# Patient Record
Sex: MALE | Race: BLACK OR AFRICAN AMERICAN | NOT HISPANIC OR LATINO | URBAN - METROPOLITAN AREA
[De-identification: names, ages, dates, MRNs, and addresses within clinical notes are randomized per-mention and may not be internally consistent; named-entity substitution may affect disease eponyms.]

---

## 2024-03-30 ENCOUNTER — EMERGENCY (EMERGENCY)
Facility: HOSPITAL | Age: 24
LOS: 0 days | Discharge: HOME | End: 2024-03-30
Attending: STUDENT IN AN ORGANIZED HEALTH CARE EDUCATION/TRAINING PROGRAM
Payer: SELF-PAY

## 2024-03-30 VITALS
OXYGEN SATURATION: 100 % | DIASTOLIC BLOOD PRESSURE: 78 MMHG | SYSTOLIC BLOOD PRESSURE: 132 MMHG | RESPIRATION RATE: 16 BRPM | HEART RATE: 67 BPM | TEMPERATURE: 98 F
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
## 2024-03-30 DIAGNOSIS — T15.01XA FOREIGN BODY IN CORNEA, RIGHT EYE, INITIAL ENCOUNTER: ICD-10-CM
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
## 2024-03-30 PROCEDURE — 99284 EMERGENCY DEPT VISIT MOD MDM: CPT

## 2024-03-30 PROCEDURE — 99283 EMERGENCY DEPT VISIT LOW MDM: CPT

## 2024-03-30 RX ORDER — FLUORESCEIN SODIUM 9 MG
1 STRIP OPHTHALMIC (EYE) ONCE
Refills: 0 | Status: DISCONTINUED | OUTPATIENT
Start: 2024-03-30 | End: 2024-03-30

## 2024-03-30 RX ORDER — POLYMYXIN B SULF/TRIMETHOPRIM 10000-1/ML
1 DROPS OPHTHALMIC (EYE) ONCE
Refills: 0 | Status: DISCONTINUED | OUTPATIENT
Start: 2024-03-30 | End: 2024-03-30

## 2024-03-30 NOTE — ED PROVIDER NOTE - PHYSICAL EXAMINATION
GENERAL: Well-developed; well-nourished; in no acute distress.  SKIN: warm, well perfused  HEAD: Normocephalic; atraumatic.  EYES: mild conjunctival erythema of right eye. Foreign body on lateral cornea at 9 oclock position. ocular motions intact and appropriate  ENT: No nasal discharge; airway clear.  CARD: Regular rate and rhythm.   RESP: LCTAB; No wheezes or crackles  ABD: soft, nontender, and nondistended  Upper EXT: Normal ROM. Rad pulses 2+ symm, cap refill <2 secs, sensation intact and symm,  strength 5/5  Lower EXT: strength 5/5, ambulates well independently GENERAL: Well-developed; well-nourished; in no acute distress.  SKIN: warm, well perfused  HEAD: Normocephalic; atraumatic.  EYES: mild conjunctival erythema of right eye. Foreign body on lateral cornea at 9 oclock position. ocular motions intact and appropriate. Fluorescein stained eye and examined with blue light, no aqueous leakage or signs of globe rupture. Foreign body very small was removed. Visual acuity 20/20 b/l and equal.  ENT: No nasal discharge; airway clear.  CARD: Regular rate and rhythm.   RESP: LCTAB; No wheezes or crackles  ABD: soft, nontender, and nondistended  Upper EXT: Normal ROM. Rad pulses 2+ symm, cap refill <2 secs, sensation intact and symm,  strength 5/5  Lower EXT: strength 5/5, ambulates well independently

## 2024-03-30 NOTE — ED PROVIDER NOTE - CLINICAL SUMMARY MEDICAL DECISION MAKING FREE TEXT BOX
.    23-year-old male, no PMH, p/w foreign body sensation x 2 days, status post working with metal and some small particle flew into his eye.  + Eye pain.  + Increased tearing.  No vision changes, discharge, fever.    Exam as noted above, with fluorescein stain negative Isabel, small foreign body with developing rust ring seen at about the 9 o'clock position.  Small foreign body/metal particle removed with Q-tip, but developing rust ring remained.      Case discussed with ophthalmology, recommend antibiotic ophthalmic drops, and ophthalmology follow-up on Monday (2 days)    Impression ocular foreign body.  Considered admission or OBS, but given HPI, PE, w/up and ED course, admission or OBS is not indicated.    Discussed all available results with Pt.  Pt understands results, plan of care, abx drops use, outpt follow up with [facility] as discussed, and signs and symptoms for ED return.  Pt is comfortable with discharge. DC home.     .

## 2024-03-30 NOTE — ED PROVIDER NOTE - OBJECTIVE STATEMENT
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  Patient states that her 2 days ago he was shouldering metal when something flew into his head.  Has been having right-sided eye pain since then.  Endorses some teary eye.  No changes in vision.  No discharge.  No fevers, chest pain, abdominal pain.  No injuries anywhere else.

## 2024-03-30 NOTE — ED PROVIDER NOTE - NSFOLLOWUPINSTRUCTIONS_ED_ALL_ED_FT
Please follow up with your primary care physician. Return to the emergency department if your symptoms do not resolve and/or worsen. If you've been prescribed medications, please take your medications as prescribed.  ------------------------------------------------------------------------------------------------------------------------  Eye Foreign Body    WHAT YOU NEED TO KNOW:    What is an eye foreign body (EFB)? An EFB is an object that gets stuck in your eye. The object may be on the surface or in a deeper part of your eye. A FB in a deeper part of your eye may cause permanent damage or blindness. This must be treated immediately. Pieces of metal, dust, wood, and sand are the most common foreign bodies.  Eye Anatomy    What are the signs and symptoms of an EFB?    Feeling like something is in your eye    Eye pain, redness, swelling, or watering    Sensitivity to light    Blurry vision or changes in your vision  How is an EFB diagnosed? Your healthcare provider will ask about your symptoms and examine your eye. Tell your provider what you were doing when symptoms began and if you know what is in your eye. The provider may check your vision by asking you to read letters or numbers off of a chart. You may need any of the following:    A slit-lamp test uses a microscope to look into your eye and check for injury. A dye may be used to look for scratches or other damage to your eye.        Ultrasound or CT pictures may show where the FB is located in your eye. It may also show damage to deeper parts of your eye. You may be given contrast liquid to help your eye show up better in pictures. Tell the healthcare provider if you have ever had an allergic reaction to contrast liquid.  How is an EFB treated? Medicines will be given to decrease pain or prevent an infection. Your healthcare provider may numb your eye and flush it with liquid to help remove the FB. The provider may also use a cotton swab or other tools to help remove the FB. If the FB is hard to remove or has damaged deeper parts of your eye, you will need surgery to remove it.    What can I do to help my eye heal? You may have pain, sensitivity to light, or blurry vision for a few days. Do the following to help your eye heal:    Do not rub your eye. This may cause more damage or infection.    Do not wear your contacts lenses until your eye heals. Ask your healthcare provider how long to follow this direction.    Wear sunglasses as directed. Sunglasses help protect the eye and decrease sensitivity to light.  What can I do to prevent another EFB?    Wear safety glasses, eye shields, or goggles. These items can prevent eye injury. Make sure the eyewear wraps around the sides of your face. Wear these items while you work with chemicals, metal, wood, or bodily fluids such as blood. Also wear protective eyewear during sports such as racquetball or swimming. Do not use regular eye glasses for eye protection. They will not protect your eyes from foreign bodies or chemicals.  Safety Glasses      Use contact lenses as directed. Wash your hands before you clean, insert, or remove your contacts. Insert and remove contact lenses correctly. Clean and change your contacts as directed to help prevent eye damage or infection.  Handwashing  When should I seek immediate care?    You suddenly lose your vision.    You have severe eye pain.  When should I call my doctor or ophthalmologist?    You have new or worse eye swelling.    Your symptoms do not get better, even after the foreign body is removed.    You have white or yellow fluid draining from your eye.    You have questions or concerns about your condition or care.

## 2024-03-30 NOTE — ED PROVIDER NOTE - PROGRESS NOTE DETAILS
BT: Spoke with ophthalmology, patient should follow-up with clinic.  Moxifloxacin drops prescribed to patient.

## 2024-03-30 NOTE — ED ADULT NURSE NOTE - OBJECTIVE STATEMENT
Pt presents to the ED c/o eye redness and a foreign body in the eye. Pt reports that he was cutting metal 3 days ago and feels that something metal got into his eye.

## 2024-03-30 NOTE — ED ADULT TRIAGE NOTE - CHIEF COMPLAINT QUOTE
Patient presents to the ED c/o redness and pain to right eye s/p possible FB. As per patient he was cutting metal and thinks something may be in his eye 3 days ago.

## 2024-03-30 NOTE — ED PROVIDER NOTE - PATIENT PORTAL LINK FT
You can access the FollowMyHealth Patient Portal offered by [facility] by registering at the following website: [email or web address]. By joining "[employer]’s FollowMyHealth portal, you will also be able to view your health information using other applications (apps) compatible with our system.

## 2024-03-30 NOTE — ED PROVIDER NOTE - NSFOLLOWUPCLINICS_GEN_ALL_ED_FT
[facility]  Ophthalmolgy  [address], [address]  [place] [address]  Phone: [phone]  Fax:   Scheduled Appointment: 4/1/2024

## 2024-04-02 ENCOUNTER — OUTPATIENT (OUTPATIENT)
Dept: OUTPATIENT SERVICES | Facility: HOSPITAL | Age: 24
LOS: 1 days | End: 2024-04-02
Payer: MEDICAID

## 2024-04-02 ENCOUNTER — APPOINTMENT (OUTPATIENT)
Dept: OPHTHALMOLOGY | Facility: CLINIC | Age: 24
End: 2024-04-02
Payer: MEDICAID

## 2024-04-02 DIAGNOSIS — H53.8 OTHER VISUAL DISTURBANCES: ICD-10-CM

## 2024-04-02 PROBLEM — Z00.00 ENCOUNTER FOR PREVENTIVE HEALTH EXAMINATION: Status: ACTIVE | Noted: 2024-04-02

## 2024-04-02 PROCEDURE — 92002 INTRM OPH EXAM NEW PATIENT: CPT | Mod: 25

## 2024-04-02 PROCEDURE — 65222 REMOVE FOREIGN BODY FROM EYE: CPT | Mod: RT

## 2024-04-02 PROCEDURE — 92002 INTRM OPH EXAM NEW PATIENT: CPT

## 2024-04-03 ENCOUNTER — APPOINTMENT (OUTPATIENT)
Dept: OPHTHALMOLOGY | Facility: CLINIC | Age: 24
End: 2024-04-03
Payer: SELF-PAY

## 2024-04-03 ENCOUNTER — OUTPATIENT (OUTPATIENT)
Dept: OUTPATIENT SERVICES | Facility: HOSPITAL | Age: 24
LOS: 1 days | End: 2024-04-03
Payer: SELF-PAY

## 2024-04-03 DIAGNOSIS — H53.8 OTHER VISUAL DISTURBANCES: ICD-10-CM

## 2024-04-03 PROCEDURE — 92012 INTRM OPH EXAM EST PATIENT: CPT

## 2024-04-12 ENCOUNTER — APPOINTMENT (OUTPATIENT)
Dept: OPHTHALMOLOGY | Facility: CLINIC | Age: 24
End: 2024-04-12

## 2024-04-15 DIAGNOSIS — T15.01XA FOREIGN BODY IN CORNEA, RIGHT EYE, INITIAL ENCOUNTER: ICD-10-CM

## 2024-04-15 DIAGNOSIS — T15.01XS FOREIGN BODY IN CORNEA, RIGHT EYE, SEQUELA: ICD-10-CM

## 2025-01-28 ENCOUNTER — EMERGENCY (EMERGENCY)
Facility: HOSPITAL | Age: 25
LOS: 0 days | Discharge: HOME | End: 2025-01-28
Attending: STUDENT IN AN ORGANIZED HEALTH CARE EDUCATION/TRAINING PROGRAM
Payer: SELF-PAY

## 2025-01-28 VITALS
HEART RATE: 74 BPM | WEIGHT: 131.84 LBS | DIASTOLIC BLOOD PRESSURE: 86 MMHG | RESPIRATION RATE: 20 BRPM | TEMPERATURE: 98 F | SYSTOLIC BLOOD PRESSURE: 159 MMHG | OXYGEN SATURATION: 99 %

## 2025-01-28 DIAGNOSIS — K08.89 OTHER SPECIFIED DISORDERS OF TEETH AND SUPPORTING STRUCTURES: ICD-10-CM

## 2025-01-28 DIAGNOSIS — M54.89 OTHER DORSALGIA: ICD-10-CM

## 2025-01-28 PROCEDURE — 99284 EMERGENCY DEPT VISIT MOD MDM: CPT

## 2025-01-28 PROCEDURE — 99283 EMERGENCY DEPT VISIT LOW MDM: CPT

## 2025-01-28 RX ORDER — IBUPROFEN 200 MG
1 TABLET ORAL
Qty: 20 | Refills: 0
Start: 2025-01-28 | End: 2025-02-01

## 2025-01-28 RX ORDER — AMOXICILLIN/POTASSIUM CLAV 875-125 MG
875 TABLET ORAL
Qty: 20 | Refills: 0
Start: 2025-01-28 | End: 2025-02-06

## 2025-01-28 RX ORDER — IBUPROFEN 200 MG
600 TABLET ORAL ONCE
Refills: 0 | Status: COMPLETED | OUTPATIENT
Start: 2025-01-28 | End: 2025-01-28

## 2025-01-28 RX ADMIN — Medication 600 MILLIGRAM(S): at 14:37

## 2025-01-28 NOTE — ED PROVIDER NOTE - PHYSICAL EXAMINATION
Constitutional: Well developed, well nourished, no acute distress  Head: Normocephalic, Atraumatic  Eyes: PERRLA, EOMI, conjunctiva and sclera WNL  ENT: Moist mucous membranes, no rhinorrhea, Clear oropharynx, right upper back molar broken with overlying exudate no abscess present with overlying tenderness, no facial swelling  EXT/MS: Moving all extremities; Distal pulses 2+ B/L  Skin: Normal for age and race; Warm and dry; No rash  Neurologic: AAO x 4;  Normal motor and sensory function  Psychiatric: Appropriate affect, normal mood

## 2025-01-28 NOTE — ED PROVIDER NOTE - PATIENT PORTAL LINK FT
You can access the FollowMyHealth Patient Portal offered by [facility] by registering at the following website: [email or web address]. By joining [employer]’s FollowMyHealth portal, you will also be able to view your health information using other applications (apps) compatible with our system.

## 2025-01-28 NOTE — CONSULT NOTE ADULT - ASSESSMENT
Patient is a 24y old  Male who presents with a chief complaint of upper right side dental pain. [place] : [id number].    HPI: Patient indicates that the pain has persisted for the past week, he has been unable to sleep for the past 3 nights.    Allergies  No Known Allergies    *Last Dental Visit: Patient cannot recall.    Vital Signs Last 24 Hrs  T(C): 36.7 (28 Jan 2025 13:25), Max: 36.7 (28 Jan 2025 13:25)  T(F): 98 (28 Jan 2025 13:25), Max: 98 (28 Jan 2025 13:25)  HR: 74 (28 Jan 2025 13:25) (74 - 74)  BP: 159/86 (28 Jan 2025 13:25) (159/86 - 159/86)  BP(mean): --  RR: 20 (28 Jan 2025 13:25) (20 - 20)  SpO2: 99% (28 Jan 2025 13:25) (99% - 99%)    Parameters below as of 28 Jan 2025 13:25  Patient On (Oxygen Delivery Method): room air    EOE:  TMJ ( -) clicks                     ( - ) pops                     ( -) crepitus             Mandible SHAWNEE within normal limits.             ( -) trismus             ( -) lymphadenopathy             ( -) swelling             ( -) asymmetry             ( -) palpation             ( -) dyspnea             ( -) dysphagia             ( -) loss of consciousness    IOE:  <<permanent>> dentition: <<grossly intact>>           hard/soft palate:  <<No pathology noted>>           tongue/FOM <<No pathology noted>>           labial/buccal mucosa <<No pathology noted>>           ( -) percussion           ( -) palpation           ( -) swelling            ( -) abscess           ( -) sinus tract    *ASSESSMENT: Clinical exam completed in the ED. Extraoral exam reveals no significant findings, no signs of swelling, infection or inflammation. Intraoral exam reveals no signs of swelling, infection or inflammation. Tooth #1 has extensive occlusal decay, requires further radiographic evaluation. Informed patient that tooth #1 likely requires extraction but requires radiographic evaluation. Patient upset that the tooth cannot be extracted today but encouraged patient that he can return tomorrow morning for further evaluation. Please prescribe Amoxicillin 500mg, Ibuprofen 600mg to stabilize patient.    *PLAN: Patient will return to dental clinic at [facility] for evaluation and treatment (if indicated).      RECOMMENDATIONS:  1) Evaluation & treatment (if indicated) in the dental clinic at [facility],.     Resident Name, pager #: Jennifer Bryan DDS

## 2025-01-28 NOTE — ED PROVIDER NOTE - NSFOLLOWUPINSTRUCTIONS_ED_ALL_ED_FT
Dental Pain: What It Means  A close-up view of two teeth. One tooth is normal, and the other has tooth decay and an abscess.  Dental pain is often a sign that something is wrong with your teeth or gums. You can also have pain after a dental treatment. If you have dental pain, it's important to contact your dental care provider, especially if you don't know what's causing the pain.    Dental pain may hurt a lot or a little. It can be caused by many things, including:  Tooth decay. This is also called cavities or caries.  Infection.  An abscess. This is when the inner part of the tooth is filled with pus.  An injury or a crack in the tooth.  Gum disease or gums that move back and expose the root of a tooth.  Abnormal grinding or clenching of teeth.  Not taking good care of your teeth.  Sometimes the cause of pain isn't known.    You may have pain all the time, or it may come and go. It may happen only when you're:  Chewing.  Exposed to hot or cold temperatures.  Eating or drinking foods or drinks with a lot of sugar in them, such as soda or candy.  Follow these instructions at home:  Medicines    Take your medicines only as told.  If you were given antibiotics, take them as told. Do not stop taking them even if you start to feel better.  Eating and drinking    Avoid foods or drinks that cause you pain. These may include:  Very hot or very cold foods or drinks.  Sweet or sugary foods or drinks.  Managing pain and swelling    Bag of ice on a towel on the skin.  Use ice or an ice pack on the painful area of your face as told.  Put ice in a plastic bag.  Place a towel between your skin and the ice.  Leave the ice on for 20 minutes, 2–3 times a day.  Remove the ice if your skin turns bright red. This is very important. If you cannot feel pain, heat, or cold, you have a greater risk of damage to the area.  If your skin turns red, take off the ice right away to prevent skin damage. The risk of damage is higher if you can't feel pain, heat, or cold.  Brushing and flossing    Brush your teeth twice a day using a fluoride toothpaste.  Use a toothpaste made for sensitive teeth as told by your dental care provider.  Always use a soft toothbrush. This will help prevent irritation of your gums.  Floss your teeth at least once a day.  General instructions    Do not apply heat to the outside of your face.  To cleanse your mouth and help with any irritation and swelling in the painful area, you can try rinsing with salt water.  Swish and gargle with salt water and then spit it out. To make salt water, add a half to a whole spoonful of salt to a glass of warm water. Mix well.  You can repeat this every 2–3 hours for pain.  Contact a dental care provider if:  You have dental pain and you don't know why.  Your pain isn't controlled with medicines.  Your symptoms get worse.  You have new symptoms.  Get help right away if:  You can't open your mouth.  You're having trouble breathing or swallowing.  You have a fever.  Your face, neck, or jaw is swollen.  These symptoms may be an emergency. Call 911 right away.  Do not wait to see if the symptoms will go away.  Do not drive yourself to the hospital.  This information is not intended to replace advice given to you by your health care provider. Make sure you discuss any questions you have with your health care provider.

## 2025-01-28 NOTE — ED PROVIDER NOTE - ATTENDING CONTRIBUTION TO CARE
I personally evaluated the patient. I reviewed the Resident’s or Physician Assistant’s note (as assigned above), and agree with the findings and plan except as documented in my note.    24-year-old male past medical history of anemia complaining of right upper tooth pain x 1 week.  No fevers or chills  CONSTITUTIONAL: WA / WN / NAD  HEAD: NCAT  EYES: PERRL; EOMI;   ENT: +ttp tooth #1 with mild fluctuance no apical abscess   NECK: Supple;  SKIN: Warm and dry;   NEURO: AAOx3  PSYCH: Memory Intact, Normal Affect

## 2025-01-28 NOTE — ED PROVIDER NOTE - OBJECTIVE STATEMENT
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  Patient reports pain worsening over the past week.  Patient has been kept up from sleeping due to the pain.  Patient denies any abscess or swelling.  No fevers, headache, dizziness, lightness, nausea, vomiting.  Patient has not take anything for his pain today.  Patient has no other complaints at this time.

## 2025-01-28 NOTE — ED ADULT NURSE NOTE - NSFALLUNIVINTERV_ED_ALL_ED
Bed/Stretcher in lowest position, wheels locked, appropriate side rails in place/Call bell, personal items and telephone in reach/Instruct patient to call for assistance before getting out of bed/chair/stretcher/Non-slip footwear applied when patient is off stretcher/[place] to call system/Physically safe environment - no spills, clutter or unnecessary equipment/Purposeful proactive rounding/Room/bathroom lighting operational, light cord in reach

## 2025-01-28 NOTE — ED PROVIDER NOTE - CLINICAL SUMMARY MEDICAL DECISION MAKING FREE TEXT BOX
24-year-old male past medical history of anemia complaining of right upper tooth pain x 1 week.  No fevers or chills vs reviewed. pain meds given dental consulted and evaluated recommended po antibiotics and follow up in dental clinic